# Patient Record
Sex: MALE | Race: WHITE | Employment: OTHER
[De-identification: names, ages, dates, MRNs, and addresses within clinical notes are randomized per-mention and may not be internally consistent; named-entity substitution may affect disease eponyms.]

---

## 2024-11-15 ENCOUNTER — TELEPHONE (OUTPATIENT)
Facility: HOSPITAL | Age: 69
End: 2024-11-15

## 2024-11-19 ENCOUNTER — HOSPITAL ENCOUNTER (OUTPATIENT)
Facility: HOSPITAL | Age: 69
Setting detail: RECURRING SERIES
Discharge: HOME | End: 2024-11-22
Payer: MEDICARE

## 2024-11-19 PROCEDURE — 97110 THERAPEUTIC EXERCISES: CPT

## 2024-11-19 PROCEDURE — 97161 PT EVAL LOW COMPLEX 20 MIN: CPT

## 2024-11-19 NOTE — PROGRESS NOTES
strength and decrease pain to improve the patient’s ability to complete ADLs            With   [] TE   [] TA   [] neuro   [] other: Patient Education: [x] Review HEP    [] Progressed/Changed HEP based on:   [] positioning   [] body mechanics   [] transfers   [] heat/ice application    [] other:      Physical Therapy Evaluation - Lumbar Spine    OBJECTIVE  Posture:  Lateral Shift: [] R    [] L     [] +  [] -  Kyphosis: [] Increased [] Decreased   []  WNL  Lordosis:  [] Increased [] Decreased   [] WNL  Pelvic symmetry: [] WNL    [] Other:    Gait:  [] Normal     [] Abnormal:    Active Movements: [] N/A   [] Too acute   [] Other:  ROM Degrees Comments:pain, area   Forward flexion    Fingertips 17 inches from floor/pain   Extension  24  Mild pain   SB right  22    SB left  24    Rotation right  32  Increased pain   Rotation left  42      Repeated Movements: increased pain flexion and extension  Side Glide:  Sustained passive positioning test:    Neuro Screen [x] WNL  Myotome/Dermatome/Reflexes:  Comments:    Dural Mobility:  SLR Supine: [] R    [] L    [] +    [] -  @ (degrees):   Slump Test: [] R    [] L    [] +    [] -  @ (degrees):   Prone Knee Bend: [] R    [] L    [] +    [] -     Palpation  [] Min  [x] Mod  [] Severe    Location: right PSIS area  [] Min  [] Mod  [] Severe    Location:    Strength  Bilateral LE 3+/5    Special Tests  Lumbar:  Lumb. Compression: [x] Pos  [] Neg               Lumbar Distraction:   [] Pos  [x] Neg    Quadrant:  [x] Pos  [] Neg   [] Flex  [x] Ext    Sacroilliac:  Gaenslen's: [] R    [] L    [] +    [x] -     Compression: [] +    [x] -     Gapping:  [] +    [x] -            Hip: Best:  [] R    [] L    [] +    [x] -     Scour:  [] R    [] L    [] +    [x] -     Piriformis: [] R    [] L    [] +    [x] -     Other tests/comments:   Very reactive to PA glides L4 and L5    Pain Level (0-10 scale) post treatment: 0    ASSESSMENT/Changes in Function: Patient presents with signs/symptoms 
IE     Patent will be able to lift 20 pounds from floor pain free to be able to complete ADLs.   Status at IE: 0 pounds with increased pain reaching to floor.   Current: Same as IE     Patient will improve Oswestry Disability Index for Low Back Pain/Dysfunction score by MCID of 13%  to demonstrate improvement in functional status.   Status at IE:17%   Current: Same as IE      Frequency / Duration: Patient to be seen 2 times per week for 8 weeks    Patient/ Caregiver education and instruction: Diagnosis, prognosis, self care, activity modification, and exercises     [x]  Plan of care has been reviewed with PTA    Certification Period: 11/19/24 to 2/14/24  Max Lawrence, PT 11/19/2024 2:05 PM  _____________________________________________________________________  I certify that the above Therapy Services are being furnished while the patient is under my care. I agree with the treatment plan and certify that this therapy is necessary.    Physician's Signature:_______________________ Date:_________ TIME:________                              Taisha Lawrence FNP  Insurance: Payor: MEDICARE / Plan: MEDICARE PART A AND B / Product Type: *No Product type* /      ** Signature, Date and Time must be completed for valid certification **    [facility]  [address] [address]  Phone: [phone]   Fax: [phone]

## 2024-12-04 ENCOUNTER — HOSPITAL ENCOUNTER (OUTPATIENT)
Facility: HOSPITAL | Age: 69
Setting detail: RECURRING SERIES
Discharge: HOME | End: 2024-12-07
Payer: MEDICARE

## 2024-12-04 PROCEDURE — 97112 NEUROMUSCULAR REEDUCATION: CPT

## 2024-12-04 PROCEDURE — 97530 THERAPEUTIC ACTIVITIES: CPT

## 2024-12-04 PROCEDURE — 97110 THERAPEUTIC EXERCISES: CPT

## 2024-12-04 NOTE — PROGRESS NOTES
PHYSICAL / OCCUPATIONAL THERAPY - DAILY TREATMENT NOTE (updated )    Patient Name: Ellis Hernández    Date: 2024    : 1955  Insurance: Payor: MEDICARE / Plan: MEDICARE PART A AND B / Product Type: *No Product type* /      Patient  verified Yes     Visit #   Current / Total 2 16   Time   In / Out 0807 0859   Pain   In / Out \"Sore\" 0   Subjective Functional Status/Changes: \"I have been consistent with the exercises you first taught me and the pain is not bad. I just feel a bit sore from the exercises and I still have trouble bending over.\"   Changes to:  Meds, Allergies, Med Hx, Sx Hx?  If yes, update Summary List no       TREATMENT AREA =  Other low back pain [M54.59]    If an interpreting service is utilized for treatment of this patient, the contents of this document represent the material reviewed with the patient via the .     OBJECTIVE    Therapeutic Procedures:  Tx Min Billable or 1:1 Min (if diff from Tx Min) Procedure, Rationale, Specifics   22 17 [id number] Therapeutic Exercise (timed):  increase ROM, strength, coordination, balance, and proprioception to improve patient's ability to progress to PLOF and address remaining functional goals. (see flow sheet as applicable)     Details if applicable:       15  31204 Therapeutic Activity (timed):  use of dynamic activities replicating functional movements to increase ROM, strength, coordination, balance, and proprioception in order to improve patient's ability to progress to PLOF and address remaining functional goals.  (see flow sheet as applicable)     Details if applicable:     15  12788 Neuromuscular Re-Education (timed):  improve balance, coordination, kinesthetic sense, posture, core stability and proprioception to improve patient's ability to develop conscious control of individual muscles and awareness of position of extremities in order to progress to PLOF and address remaining functional goals. (see flow sheet as applicable)

## 2024-12-06 ENCOUNTER — HOSPITAL ENCOUNTER (OUTPATIENT)
Facility: HOSPITAL | Age: 69
Setting detail: RECURRING SERIES
Discharge: HOME | End: 2024-12-09
Payer: MEDICARE

## 2024-12-06 PROCEDURE — 97530 THERAPEUTIC ACTIVITIES: CPT

## 2024-12-06 PROCEDURE — 97110 THERAPEUTIC EXERCISES: CPT

## 2024-12-06 PROCEDURE — 97112 NEUROMUSCULAR REEDUCATION: CPT

## 2024-12-06 NOTE — PROGRESS NOTES
PHYSICAL / OCCUPATIONAL THERAPY - DAILY TREATMENT NOTE (updated )    Patient Name: Ellis Hernández    Date: 2024    : 1955  Insurance: Payor: MEDICARE / Plan: MEDICARE PART A AND B / Product Type: *No Product type* /      Patient  verified Yes     Visit #   Current / Total 3 16   Time   In / Out 1010 1017   Pain   In / Out 2-3 0   Subjective Functional Status/Changes: \"The back is more just sore rather than painful.\"   Changes to:  Meds, Allergies, Med Hx, Sx Hx?  If yes, update Summary List no       TREATMENT AREA =  Other low back pain [M54.59]    If an interpreting service is utilized for treatment of this patient, the contents of this document represent the material reviewed with the patient via the .     OBJECTIVE    Therapeutic Procedures:  Tx Min Billable or 1:1 Min (if diff from Tx Min) Procedure, Rationale, Specifics   37 32 44925 Therapeutic Exercise (timed):  increase ROM, strength, coordination, balance, and proprioception to improve patient's ability to progress to PLOF and address remaining functional goals. (see flow sheet as applicable)     Details if applicable:       15  47681 Therapeutic Activity (timed):  use of dynamic activities replicating functional movements to increase ROM, strength, coordination, balance, and proprioception in order to improve patient's ability to progress to PLOF and address remaining functional goals.  (see flow sheet as applicable)     Details if applicable:     15  22240 Neuromuscular Re-Education (timed):  improve balance, coordination, kinesthetic sense, posture, core stability and proprioception to improve patient's ability to develop conscious control of individual muscles and awareness of position of extremities in order to progress to PLOF and address remaining functional goals. (see flow sheet as applicable)     Details if applicable:     67 62 [facility] Totals Reminder: bill using total billable min of TIMED therapeutic procedures

## 2024-12-10 ENCOUNTER — HOSPITAL ENCOUNTER (OUTPATIENT)
Facility: HOSPITAL | Age: 69
Setting detail: RECURRING SERIES
Discharge: HOME | End: 2024-12-13
Payer: MEDICARE

## 2024-12-10 PROCEDURE — 97112 NEUROMUSCULAR REEDUCATION: CPT

## 2024-12-10 PROCEDURE — 97110 THERAPEUTIC EXERCISES: CPT

## 2024-12-10 PROCEDURE — 97530 THERAPEUTIC ACTIVITIES: CPT

## 2024-12-10 NOTE — PROGRESS NOTES
PHYSICAL / OCCUPATIONAL THERAPY - DAILY TREATMENT NOTE (updated )    Patient Name: Ellis Hernández    Date: 12/10/2024    : 1955  Insurance: Payor: MEDICARE / Plan: MEDICARE PART A AND B / Product Type: *No Product type* /      Patient  verified Yes     Visit #   Current / Total 4 16   Time   In / Out 0923 1014   Pain   In / Out 1-2 0   Subjective Functional Status/Changes: \"The pain is steadily easing up and I am feeling more confident in my daily activities as the strength of my back improves.   Changes to:  Meds, Allergies, Med Hx, Sx Hx?  If yes, update Summary List no       TREATMENT AREA =  Other low back pain [M54.59]    If an interpreting service is utilized for treatment of this patient, the contents of this document represent the material reviewed with the patient via the .     OBJECTIVE    Therapeutic Procedures:  Tx Min Billable or 1:1 Min (if diff from Tx Min) Procedure, Rationale, Specifics   21  13597 Therapeutic Exercise (timed):  increase ROM, strength, coordination, balance, and proprioception to improve patient's ability to progress to PLOF and address remaining functional goals. (see flow sheet as applicable)     Details if applicable:       15  46852 Therapeutic Activity (timed):  use of dynamic activities replicating functional movements to increase ROM, strength, coordination, balance, and proprioception in order to improve patient's ability to progress to PLOF and address remaining functional goals.  (see flow sheet as applicable)     Details if applicable:     15  54070 Neuromuscular Re-Education (timed):  improve balance, coordination, kinesthetic sense, posture, core stability and proprioception to improve patient's ability to develop conscious control of individual muscles and awareness of position of extremities in order to progress to PLOF and address remaining functional goals. (see flow sheet as applicable)     Details if applicable:     51  [facility] Totals

## 2024-12-13 ENCOUNTER — HOSPITAL ENCOUNTER (OUTPATIENT)
Facility: HOSPITAL | Age: 69
Setting detail: RECURRING SERIES
Discharge: HOME | End: 2024-12-16
Payer: MEDICARE

## 2024-12-13 PROCEDURE — 97110 THERAPEUTIC EXERCISES: CPT

## 2024-12-13 PROCEDURE — 97112 NEUROMUSCULAR REEDUCATION: CPT

## 2024-12-13 PROCEDURE — 97530 THERAPEUTIC ACTIVITIES: CPT

## 2024-12-13 NOTE — PROGRESS NOTES
PHYSICAL / OCCUPATIONAL THERAPY - DAILY TREATMENT NOTE (updated )    Patient Name: Ellis Hernández    Date: 2024    : 1955  Insurance: Payor: MEDICARE / Plan: MEDICARE PART A AND B / Product Type: *No Product type* /      Patient  verified Yes     Visit #   Current / Total 5 16   Time   In / Out 1007 1107   Pain   In / Out 2-3 0   Subjective Functional Status/Changes: \"I had to do a lot of housecleaning to get house ready for a party and all the bending and reaching kind of aggravated my low back.\"   Changes to:  Meds, Allergies, Med Hx, Sx Hx?  If yes, update Summary List no       TREATMENT AREA =  Other low back pain [M54.59]    If an interpreting service is utilized for treatment of this patient, the contents of this document represent the material reviewed with the patient via the .     OBJECTIVE    Therapeutic Procedures:  Tx Min Billable or 1:1 Min (if diff from Tx Min) Procedure, Rationale, Specifics   30 23 01046 Therapeutic Exercise (timed):  increase ROM, strength, coordination, balance, and proprioception to improve patient's ability to progress to PLOF and address remaining functional goals. (see flow sheet as applicable)     Details if applicable:       15  01974 Therapeutic Activity (timed):  use of dynamic activities replicating functional movements to increase ROM, strength, coordination, balance, and proprioception in order to improve patient's ability to progress to PLOF and address remaining functional goals.  (see flow sheet as applicable)     Details if applicable:     15  60747 Neuromuscular Re-Education (timed):  improve balance, coordination, kinesthetic sense, posture, core stability and proprioception to improve patient's ability to develop conscious control of individual muscles and awareness of position of extremities in order to progress to PLOF and address remaining functional goals. (see flow sheet as applicable)     Details if applicable:     60 53

## 2024-12-17 ENCOUNTER — HOSPITAL ENCOUNTER (OUTPATIENT)
Facility: HOSPITAL | Age: 69
Setting detail: RECURRING SERIES
Discharge: HOME | End: 2024-12-20
Payer: MEDICARE

## 2024-12-17 PROCEDURE — 97110 THERAPEUTIC EXERCISES: CPT

## 2024-12-17 PROCEDURE — 97112 NEUROMUSCULAR REEDUCATION: CPT

## 2024-12-17 PROCEDURE — 97530 THERAPEUTIC ACTIVITIES: CPT

## 2024-12-17 NOTE — PROGRESS NOTES
PHYSICAL / OCCUPATIONAL THERAPY - DAILY TREATMENT NOTE (updated )    Patient Name: Ellis Hernández    Date: 2024    : 1955  Insurance: Payor: MEDICARE / Plan: MEDICARE PART A AND B / Product Type: *No Product type* /      Patient  verified Yes     Visit #   Current / Total 6 16   Time   In / Out 0919 1010   Pain   In / Out 1-2 1   Subjective Functional Status/Changes: \"Pain is getting less but it is changing to more stiffness.\"   Changes to:  Meds, Allergies, Med Hx, Sx Hx?  If yes, update Summary List no       TREATMENT AREA =  Other low back pain [M54.59]    If an interpreting service is utilized for treatment of this patient, the contents of this document represent the material reviewed with the patient via the .     OBJECTIVE    Therapeutic Procedures:  Tx Min Billable or 1:1 Min (if diff from Tx Min) Procedure, Rationale, Specifics   21  02925 Therapeutic Exercise (timed):  increase ROM, strength, coordination, balance, and proprioception to improve patient's ability to progress to PLOF and address remaining functional goals. (see flow sheet as applicable)     Details if applicable:       15  60539 Therapeutic Activity (timed):  use of dynamic activities replicating functional movements to increase ROM, strength, coordination, balance, and proprioception in order to improve patient's ability to progress to PLOF and address remaining functional goals.  (see flow sheet as applicable)     Details if applicable:     15  55180 Neuromuscular Re-Education (timed):  improve balance, coordination, kinesthetic sense, posture, core stability and proprioception to improve patient's ability to develop conscious control of individual muscles and awareness of position of extremities in order to progress to PLOF and address remaining functional goals. (see flow sheet as applicable)     Details if applicable:     51  [facility] Totals Reminder: bill using total billable min of TIMED therapeutic

## 2024-12-20 ENCOUNTER — HOSPITAL ENCOUNTER (OUTPATIENT)
Facility: HOSPITAL | Age: 69
Setting detail: RECURRING SERIES
Discharge: HOME | End: 2024-12-23
Payer: MEDICARE

## 2024-12-20 PROCEDURE — 97110 THERAPEUTIC EXERCISES: CPT

## 2024-12-20 PROCEDURE — 97530 THERAPEUTIC ACTIVITIES: CPT

## 2024-12-20 PROCEDURE — 97112 NEUROMUSCULAR REEDUCATION: CPT

## 2024-12-20 NOTE — PROGRESS NOTES
PHYSICAL / OCCUPATIONAL THERAPY - DAILY TREATMENT NOTE (updated )    Patient Name: Ellis Hernández    Date: 2024    : 1955  Insurance: Payor: MEDICARE / Plan: MEDICARE PART A AND B / Product Type: *No Product type* /      Patient  verified Yes     Visit #   Current / Total 7 16   Time   In / Out 1002 1108   Pain   In / Out 0-1 0   Subjective Functional Status/Changes: \"I have a little discomfort when I sit for too long, especially in the car, but other than that it is dong much better.\"   Changes to:  Meds, Allergies, Med Hx, Sx Hx?  If yes, update Summary List no       TREATMENT AREA =  Other low back pain [M54.59]    If an interpreting service is utilized for treatment of this patient, the contents of this document represent the material reviewed with the patient via the .     OBJECTIVE    Therapeutic Procedures:  Tx Min Billable or 1:1 Min (if diff from Tx Min) Procedure, Rationale, Specifics   36 24 34304 Therapeutic Exercise (timed):  increase ROM, strength, coordination, balance, and proprioception to improve patient's ability to progress to PLOF and address remaining functional goals. (see flow sheet as applicable)     Details if applicable:       15  65860 Therapeutic Activity (timed):  use of dynamic activities replicating functional movements to increase ROM, strength, coordination, balance, and proprioception in order to improve patient's ability to progress to PLOF and address remaining functional goals.  (see flow sheet as applicable)     Details if applicable:     15  10560 Neuromuscular Re-Education (timed):  improve balance, coordination, kinesthetic sense, posture, core stability and proprioception to improve patient's ability to develop conscious control of individual muscles and awareness of position of extremities in order to progress to PLOF and address remaining functional goals. (see flow sheet as applicable)     Details if applicable:     66 54  [facility] Totals

## 2024-12-20 NOTE — PROGRESS NOTES
[facility]  [address] [address]                                                                                      Phone: [phone]   Fax: [phone]    Progress Note  Patient name: Ellis Hernández Start of Care: 2024   Referral source: Taisha Lawrence FNP : 1955               Medical Diagnosis: Other low back pain [M54.59]      Onset Date: 10/8/24               Treatment Diagnosis:  M54.59  OTHER LOWER BACK PAIN                                          Prior Hospitalization: see medical history Provider#: [id number]   Medications: Verified on Patient Summary List      Comorbidities: none  Prior Level of Function: avid walker 3.3 miles per day, stretching, light weights     Reporting Period: 24-24    Visits from Start of Care: 7    Missed Visits: 0    Updated Goals/Measure of Progress: To be achieved in 3 weeks:    Short Term Goals: To be accomplished in 4 weeks:                 Patient will report compliance with HEP 1x/day to aid in rehabilitation program.                 Status at IE: Patient instructed in and provided written copy of initial Home Exercise Program.                 Current: Patient reports full consistency with initial HEP and is in process of being instructed in higher level lumbar stabilization exercises to incorporate into HEP.   In-progress, 24                    Patient will increase lumbar ROM to flexion fingertips 5 inches from floor, extension 30 degrees pain free to aid in completion of ADLs.                 Status at IE: flex fingertips 17 inches from floor, ext painful at 24 deg                 Current: flex fingertips 13.5 inches from floor, ext 30 deg   In-progress, 24      Long Term Goals: To be accomplished in 8 weeks:                 Patient will increase Brent LE strength to 5/5 MMT throughout to aid in completion of ADLs.                 Status at IE: bilateral LE strength 3+/5

## 2024-12-24 ENCOUNTER — HOSPITAL ENCOUNTER (OUTPATIENT)
Facility: HOSPITAL | Age: 69
Setting detail: RECURRING SERIES
Discharge: HOME | End: 2024-12-27
Payer: MEDICARE

## 2024-12-24 PROCEDURE — 97530 THERAPEUTIC ACTIVITIES: CPT

## 2024-12-24 PROCEDURE — 97112 NEUROMUSCULAR REEDUCATION: CPT

## 2024-12-24 PROCEDURE — 97110 THERAPEUTIC EXERCISES: CPT

## 2024-12-24 NOTE — PROGRESS NOTES
Details if applicable:     53 53 [facility] Totals Reminder: bill using total billable min of TIMED therapeutic procedures (example: do not include dry needle or estim unattended, both untimed codes, in totals to left)  8-22 min = 1 unit; 23-37 min = 2 units; 38-52 min = 3 units; 53-67 min = 4 units; 68-82 min = 5 units   Total Total     TOTAL TREATMENT TIME:        53     [x]  Patient Education billed concurrently with other procedures   [x] Review HEP    [] Progressed/Changed HEP, detail:    [] Other detail:       Objective Information/Functional Measures/Assessment    Pt tolerated treatment well with occasional cues for technique. Reports tightness in back post treatment void of pain. Pt left in no apparent distress. Discussed plan for possible DC NV.    Patient will continue to benefit from skilled PT / OT services to modify and progress therapeutic interventions, analyze and address functional mobility deficits, analyze and address ROM deficits, analyze and address strength deficits, analyze and address soft tissue restrictions, analyze and cue for proper movement patterns, and instruct in home and community integration to address functional deficits and attain remaining goals.    Progress toward goals / Updated goals:  []  See Progress Note/Recertification    Short Term Goals: To be accomplished in 4 weeks:                 Patient will report compliance with HEP 1x/day to aid in rehabilitation program.                 Status at IE: Patient instructed in and provided written copy of initial Home Exercise Program.                 Current: Patient reports full consistency with initial HEP and is in process of being instructed in higher level lumbar stabilization exercises to incorporate into HEP. Reports performance daily  In-progress, 12/24/24                    Patient will increase lumbar ROM to flexion fingertips 5 inches from floor, extension 30 degrees pain free to aid in completion of ADLs.

## 2024-12-27 ENCOUNTER — HOSPITAL ENCOUNTER (OUTPATIENT)
Facility: HOSPITAL | Age: 69
Setting detail: RECURRING SERIES
Discharge: HOME | End: 2024-12-30
Payer: MEDICARE

## 2024-12-27 PROCEDURE — 97110 THERAPEUTIC EXERCISES: CPT

## 2024-12-27 PROCEDURE — 97530 THERAPEUTIC ACTIVITIES: CPT

## 2024-12-27 PROCEDURE — 97112 NEUROMUSCULAR REEDUCATION: CPT

## 2024-12-27 NOTE — PROGRESS NOTES
PHYSICAL / OCCUPATIONAL THERAPY - DAILY TREATMENT NOTE (updated )    Patient Name: Ellis Hernández    Date: 2024    : 1955  Insurance: Payor: MEDICARE / Plan: MEDICARE PART A AND B / Product Type: *No Product type* /      Patient  verified Yes     Visit #   Current / Total 9 16   Time   In / Out 1010 1124   Pain   In / Out 0-1 0   Subjective Functional Status/Changes: \"No real pain anymore, just some tightness as the base of the spine\"   Changes to:  Meds, Allergies, Med Hx, Sx Hx?  If yes, update Summary List no       TREATMENT AREA =  Other low back pain [M54.59]    If an interpreting service is utilized for treatment of this patient, the contents of this document represent the material reviewed with the patient via the .     OBJECTIVE    Therapeutic Procedures:  Tx Min Billable or 1:1 Min (if diff from Tx Min) Procedure, Rationale, Specifics   30  02476 Therapeutic Exercise (timed):  increase ROM, strength, coordination, balance, and proprioception to improve patient's ability to progress to PLOF and address remaining functional goals. (see flow sheet as applicable)     Details if applicable:       29  79543 Therapeutic Activity (timed):  use of dynamic activities replicating functional movements to increase ROM, strength, coordination, balance, and proprioception in order to improve patient's ability to progress to PLOF and address remaining functional goals.  (see flow sheet as applicable)     Details if applicable:     15  38855 Neuromuscular Re-Education (timed):  improve balance, coordination, kinesthetic sense, posture, core stability and proprioception to improve patient's ability to develop conscious control of individual muscles and awareness of position of extremities in order to progress to PLOF and address remaining functional goals. (see flow sheet as applicable)     Details if applicable:     74  [facility] Totals Reminder: bill using total billable min of TIMED

## 2024-12-27 NOTE — PROGRESS NOTES
[facility]  [address] [address]  Phone: [phone]   Fax: [phone]    Discharge Summary    Patient name: Ellis Hernández Start of Care: 2024   Referral source: Taisha Lawrence FNP : 1955               Medical Diagnosis: Other low back pain [M54.59]      Onset Date: 10/8/24               Treatment Diagnosis:  M54.59  OTHER LOWER BACK PAIN                                          Prior Hospitalization: see medical history Provider#: [id number]   Medications: Verified on Patient Summary List      Comorbidities: none  Prior Level of Function: avid walker 3.3 miles per day, stretching, light weights      Reporting Period: 24-24     Visits from Start of Care: 9                                      Missed Visits: 0    Goals/Measure of Progress:  Short Term Goals: To be accomplished in 4 weeks:                 Patient will report compliance with HEP 1x/day to aid in rehabilitation program.                 Status at IE: Patient instructed in and provided written copy of initial Home Exercise Program.                 Current: Patient reports full consistency with complete HEP.  Met, 24                    Patient will increase lumbar ROM to flexion fingertips 5 inches from floor, extension 30 degrees pain free to aid in completion of ADLs.                 Status at IE: flex fingertips 17 inches from floor, ext painful at 24 deg                 Current: flex fingertips 12 inches from floor, ext 30 deg   In-progress, 24      Long Term Goals: To be accomplished in 8 weeks:                 Patient will increase Brent LE strength to 5/5 MMT throughout to aid in completion of ADLs.                 Status at IE: bilateral LE strength 3+/5                 Current: bilateral LE strength now 5/5        Met, 24                    Patient will report pain no greater than 0-2/10 throughout entire day to aid in completion of ADLs.

## 2024-12-27 NOTE — PROGRESS NOTES
Physical Therapy Discharge Instructions    [facility]  [address] [address]  Phone: [phone]   Fax: [phone]      Patient: Ellis Hernández  : 1955    Continue Home Exercise Program 2 times per day for 6 weeks, then decrease to 4 times per week      Continue with    [] Ice  as needed      [x] Heat           Follow up with MD:     [] Upon completion of therapy     [x] As needed      Recommendations:     []   Return to activity with home program    []   Return to activity with the following modifications:       []Post Rehab Program    []Join Independent aquatic program     []Return to/join local gym      Additional Comments: Please contact clinic at above phone number if you have any questions regarding Home Exercise Program or self care instructions.